# Patient Record
Sex: FEMALE | Race: WHITE | NOT HISPANIC OR LATINO | Employment: FULL TIME | URBAN - METROPOLITAN AREA
[De-identification: names, ages, dates, MRNs, and addresses within clinical notes are randomized per-mention and may not be internally consistent; named-entity substitution may affect disease eponyms.]

---

## 2017-04-14 ENCOUNTER — APPOINTMENT (OUTPATIENT)
Dept: WOMENS IMAGING | Facility: HOSPITAL | Age: 52
End: 2017-04-14

## 2017-04-14 PROCEDURE — 77063 BREAST TOMOSYNTHESIS BI: CPT | Performed by: RADIOLOGY

## 2017-04-14 PROCEDURE — 77067 SCR MAMMO BI INCL CAD: CPT | Performed by: RADIOLOGY

## 2017-04-14 PROCEDURE — G0202 SCR MAMMO BI INCL CAD: HCPCS | Performed by: RADIOLOGY

## 2018-04-16 ENCOUNTER — APPOINTMENT (OUTPATIENT)
Dept: WOMENS IMAGING | Facility: HOSPITAL | Age: 53
End: 2018-04-16

## 2018-04-16 PROCEDURE — 77063 BREAST TOMOSYNTHESIS BI: CPT | Performed by: RADIOLOGY

## 2018-04-16 PROCEDURE — 77067 SCR MAMMO BI INCL CAD: CPT | Performed by: RADIOLOGY

## 2018-05-29 ENCOUNTER — APPOINTMENT (OUTPATIENT)
Dept: WOMENS IMAGING | Facility: HOSPITAL | Age: 53
End: 2018-05-29

## 2018-05-29 PROCEDURE — 76641 ULTRASOUND BREAST COMPLETE: CPT | Performed by: RADIOLOGY

## 2019-04-18 ENCOUNTER — APPOINTMENT (OUTPATIENT)
Dept: WOMENS IMAGING | Facility: HOSPITAL | Age: 54
End: 2019-04-18

## 2019-04-18 PROCEDURE — 77067 SCR MAMMO BI INCL CAD: CPT | Performed by: RADIOLOGY

## 2019-04-18 PROCEDURE — 77063 BREAST TOMOSYNTHESIS BI: CPT | Performed by: RADIOLOGY

## 2020-06-01 ENCOUNTER — APPOINTMENT (OUTPATIENT)
Dept: WOMENS IMAGING | Facility: HOSPITAL | Age: 55
End: 2020-06-01

## 2020-06-01 PROCEDURE — 77067 SCR MAMMO BI INCL CAD: CPT | Performed by: RADIOLOGY

## 2020-06-01 PROCEDURE — 77063 BREAST TOMOSYNTHESIS BI: CPT | Performed by: RADIOLOGY

## 2021-06-03 ENCOUNTER — APPOINTMENT (OUTPATIENT)
Dept: WOMENS IMAGING | Facility: HOSPITAL | Age: 56
End: 2021-06-03

## 2021-06-03 PROCEDURE — 77063 BREAST TOMOSYNTHESIS BI: CPT | Performed by: RADIOLOGY

## 2021-06-03 PROCEDURE — 77067 SCR MAMMO BI INCL CAD: CPT | Performed by: RADIOLOGY

## 2021-07-19 ENCOUNTER — OFFICE VISIT (OUTPATIENT)
Dept: OBSTETRICS AND GYNECOLOGY | Facility: CLINIC | Age: 56
End: 2021-07-19

## 2021-07-19 VITALS
HEIGHT: 65 IN | SYSTOLIC BLOOD PRESSURE: 126 MMHG | DIASTOLIC BLOOD PRESSURE: 74 MMHG | BODY MASS INDEX: 36.32 KG/M2 | WEIGHT: 218 LBS

## 2021-07-19 DIAGNOSIS — Z11.51 ENCOUNTER FOR SCREENING FOR HUMAN PAPILLOMAVIRUS (HPV): ICD-10-CM

## 2021-07-19 DIAGNOSIS — Z01.419 ROUTINE GYNECOLOGICAL EXAMINATION: Primary | ICD-10-CM

## 2021-07-19 DIAGNOSIS — Z01.419 PAP SMEAR, LOW-RISK: ICD-10-CM

## 2021-07-19 LAB
BILIRUB BLD-MCNC: NEGATIVE MG/DL
CLARITY, POC: CLEAR
COLOR UR: YELLOW
GLUCOSE UR STRIP-MCNC: NEGATIVE MG/DL
KETONES UR QL: NEGATIVE
LEUKOCYTE EST, POC: NEGATIVE
NITRITE UR-MCNC: NEGATIVE MG/ML
PH UR: 5 [PH] (ref 5–8)
PROT UR STRIP-MCNC: NEGATIVE MG/DL
RBC # UR STRIP: NEGATIVE /UL
SP GR UR: 1 (ref 1–1.03)
UROBILINOGEN UR QL: NORMAL

## 2021-07-19 PROCEDURE — 81002 URINALYSIS NONAUTO W/O SCOPE: CPT | Performed by: OBSTETRICS & GYNECOLOGY

## 2021-07-19 PROCEDURE — 99386 PREV VISIT NEW AGE 40-64: CPT | Performed by: OBSTETRICS & GYNECOLOGY

## 2021-07-19 RX ORDER — OMEPRAZOLE 20 MG/1
CAPSULE, DELAYED RELEASE ORAL
COMMUNITY
End: 2023-03-13 | Stop reason: SDUPTHER

## 2021-07-19 RX ORDER — CLOTRIMAZOLE AND BETAMETHASONE DIPROPIONATE 10; .64 MG/G; MG/G
CREAM TOPICAL
COMMUNITY

## 2021-07-19 RX ORDER — PREDNISONE 50 MG/1
TABLET ORAL
COMMUNITY
End: 2022-08-15

## 2021-07-19 RX ORDER — LISINOPRIL 5 MG/1
TABLET ORAL
COMMUNITY
Start: 2021-03-15 | End: 2023-03-13 | Stop reason: SDUPTHER

## 2021-07-19 RX ORDER — FEXOFENADINE HCL 180 MG/1
TABLET ORAL
COMMUNITY

## 2021-07-19 RX ORDER — FLUTICASONE PROPIONATE 50 MCG
SPRAY, SUSPENSION (ML) NASAL
COMMUNITY

## 2021-07-19 NOTE — PROGRESS NOTES
GYN Annual Exam     CC- Here for annual exam.     Kenna STERLING is a 56 y.o. female who presents for annual well woman exam. Pt is a new pt to me. LPS was 1 year ago. Pt has not had a menstrual cycle in over one year. Pt reporting vasomotor symptoms. She is not on any HRT. Pt is not sexually active: been 4 years.     OB History    No obstetric history on file.         Current contraception: post menopausal status  History of abnormal Pap smear: no  History of abnormal mammogram: yes - hx of left breast biopsy: benign  Family history of uterine, colon or ovarian cancer: no  Family history of breast cancer: no    Health Maintenance   Topic Date Due   • Annual Gynecologic Pelvic and Breast Exam  Never done   • URINE MICROALBUMIN  Never done   • COLORECTAL CANCER SCREENING  Never done   • ANNUAL PHYSICAL  Never done   • Pneumococcal Vaccine 0-64 (1 of 1 - PPSV23) Never done   • TDAP/TD VACCINES (2 - Tdap) 07/27/2015   • ZOSTER VACCINE (1 of 2) Never done   • HEPATITIS C SCREENING  Never done   • DIABETIC FOOT EXAM  Never done   • DIABETIC EYE EXAM  Never done   • INFLUENZA VACCINE  08/01/2021   • HEMOGLOBIN A1C  11/20/2021   • PAP SMEAR  05/27/2023   • MAMMOGRAM  06/03/2023   • COVID-19 Vaccine  Completed       History reviewed. No pertinent past medical history.    History reviewed. No pertinent surgical history.      Current Outpatient Medications:   •  lisinopril (PRINIVIL,ZESTRIL) 5 MG tablet, TAKE 1 TABLET BY MOUTH EVERY DAY AS DIRECTED, Disp: , Rfl:   •  metFORMIN (GLUCOPHAGE) 500 MG tablet, Take 500 mg by mouth., Disp: , Rfl:   •  vitamin D3 (vitamin d) 125 MCG (5000 UT) capsule capsule, Take 1 capsule by mouth Daily., Disp: , Rfl:   •  clotrimazole-betamethasone (LOTRISONE) 1-0.05 % cream, clotrimazole-betamethasone 1 %-0.05 % topical cream, Disp: , Rfl:   •  cyanocobalamin (VITAMIN B-12) 1000 MCG tablet, Take 1 tablet by mouth Daily., Disp: , Rfl:   •  fexofenadine (Allegra Allergy) 180 MG tablet, Allegra  "Allergy, Disp: , Rfl:   •  fluticasone (FLONASE) 50 MCG/ACT nasal spray, fluticasone propionate 50 mcg/actuation nasal spray,suspension, Disp: , Rfl:   •  Influenza Vac Subunit Quad (FLUCELVAX) 0.5 ML suspension prefilled syringe injection, Flucelvax Quad [id number] (PF) 60 mcg (15 mcg x 4)/0.5 mL IM syringe, Disp: , Rfl:   •  omeprazole (priLOSEC) 20 MG capsule, omeprazole 20 mg capsule,delayed release, Disp: , Rfl:   •  predniSONE (DELTASONE) 50 MG tablet, prednisone 50 mg tablet, Disp: , Rfl:     Allergies   Allergen Reactions   • Sulfa Antibiotics Hives       Social History     Tobacco Use   • Smoking status: Not on file   Substance Use Topics   • Alcohol use: Not on file   • Drug use: Not on file       History reviewed. No pertinent family history.    Review of Systems   Constitutional: Negative for appetite change, chills, fatigue, fever and unexpected weight change.   Gastrointestinal: Negative for abdominal distention, abdominal pain, anal bleeding, blood in stool, constipation, diarrhea, nausea and vomiting.   Genitourinary: Negative for dyspareunia, dysuria, menstrual problem, pelvic pain, vaginal bleeding, vaginal discharge and vaginal pain.       /74   Ht 165.1 cm (65\")   Wt 98.9 kg (218 lb)   LMP  (LMP Unknown) Comment: April 2020   Breastfeeding No   BMI 36.28 kg/m²     Physical Exam  Vitals reviewed.   Constitutional:       General: She is not in acute distress.     Appearance: Normal appearance. She is well-developed. She is not ill-appearing, toxic-appearing or diaphoretic.   HENT:      Mouth/Throat:      Dentition: Normal dentition. No dental caries.   Cardiovascular:      Rate and Rhythm: Normal rate and regular rhythm.      Heart sounds: Normal heart sounds.   Pulmonary:      Effort: Pulmonary effort is normal. No respiratory distress.      Breath sounds: Normal breath sounds. No stridor. No wheezing.   Chest:      Breasts:         Right: No inverted nipple, mass, nipple discharge, skin " change or tenderness.         Left: No inverted nipple, mass, nipple discharge, skin change or tenderness.   Abdominal:      General: There is no distension.      Palpations: Abdomen is soft. There is no mass.      Tenderness: There is no abdominal tenderness.   Genitourinary:     Labia:         Right: No rash, tenderness or lesion.         Left: No rash, tenderness or lesion.       Vagina: No vaginal discharge, tenderness or bleeding.      Cervix: No cervical motion tenderness, discharge or friability.      Uterus: Not deviated, not enlarged, not fixed and not tender.       Adnexa:         Right: No mass, tenderness or fullness.          Left: No mass, tenderness or fullness.     Musculoskeletal:         General: No tenderness. Normal range of motion.   Skin:     General: Skin is warm.      Findings: No erythema or rash.   Neurological:      General: No focal deficit present.      Mental Status: She is alert and oriented to person, place, and time.      Cranial Nerves: No cranial nerve deficit.      Motor: No weakness.      Coordination: Coordination normal.      Gait: Gait normal.   Psychiatric:         Mood and Affect: Mood normal.         Behavior: Behavior normal.         Thought Content: Thought content normal.         Judgment: Judgment normal.            Assessment/Plan    1) GYN HM: Check pap smear.  MMG done 6/2021. SBE demonstrated and encouraged. Colonoscopy 5 years ago- normal.   2) STD screening: declines  3) Bone health - Weight bearing exercise, dietary calcium recommendations and vitamin D reviewed.   4) Diet and Exercise discussed  5) Smoking Status: nonsmoker- quit 11 years ago.   6) Social: Renee Hdz's mother.   7) Follow up prn and 1 year       Diagnoses and all orders for this visit:    Routine gynecological examination  -     POC Urinalysis Dipstick    Pap smear, low-risk  -     IgP, Aptima HPV    Encounter for screening for human papillomavirus (HPV)  -     IgP, Aptima HPV    Other  orders  -     fexofenadine (Allegra Allergy) 180 MG tablet; Allegra Allergy  -     vitamin D3 (vitamin d) 125 MCG (5000 UT) capsule capsule; Take 1 capsule by mouth Daily.  -     clotrimazole-betamethasone (LOTRISONE) 1-0.05 % cream; clotrimazole-betamethasone 1 %-0.05 % topical cream  -     cyanocobalamin (VITAMIN B-12) 1000 MCG tablet; Take 1 tablet by mouth Daily.  -     fluticasone (FLONASE) 50 MCG/ACT nasal spray; fluticasone propionate 50 mcg/actuation nasal spray,suspension  -     Influenza Vac Subunit Quad (FLUCELVAX) 0.5 ML suspension prefilled syringe injection; Flucelvax Quad [id number] (PF) 60 mcg (15 mcg x 4)/0.5 mL IM syringe  -     lisinopril (PRINIVIL,ZESTRIL) 5 MG tablet; TAKE 1 TABLET BY MOUTH EVERY DAY AS DIRECTED  -     metFORMIN (GLUCOPHAGE) 500 MG tablet; Take 500 mg by mouth.  -     omeprazole (priLOSEC) 20 MG capsule; omeprazole 20 mg capsule,delayed release  -     predniSONE (DELTASONE) 50 MG tablet; prednisone 50 mg tablet        Bing Sullivan DO  7/19/2021  12:04 EDT

## 2021-07-21 LAB
CYTOLOGIST CVX/VAG CYTO: NORMAL
CYTOLOGY CVX/VAG DOC CYTO: NORMAL
CYTOLOGY CVX/VAG DOC THIN PREP: NORMAL
DX ICD CODE: NORMAL
HIV 1 & 2 AB SER-IMP: NORMAL
HPV I/H RISK 4 DNA CVX QL PROBE+SIG AMP: NEGATIVE
OTHER STN SPEC: NORMAL
STAT OF ADQ CVX/VAG CYTO-IMP: NORMAL

## 2022-06-06 ENCOUNTER — APPOINTMENT (OUTPATIENT)
Dept: WOMENS IMAGING | Facility: HOSPITAL | Age: 57
End: 2022-06-06

## 2022-06-06 PROCEDURE — 77063 BREAST TOMOSYNTHESIS BI: CPT | Performed by: RADIOLOGY

## 2022-06-06 PROCEDURE — 77067 SCR MAMMO BI INCL CAD: CPT | Performed by: RADIOLOGY

## 2022-08-15 ENCOUNTER — OFFICE VISIT (OUTPATIENT)
Dept: OBSTETRICS AND GYNECOLOGY | Facility: CLINIC | Age: 57
End: 2022-08-15

## 2022-08-15 VITALS
SYSTOLIC BLOOD PRESSURE: 128 MMHG | DIASTOLIC BLOOD PRESSURE: 76 MMHG | BODY MASS INDEX: 37.05 KG/M2 | WEIGHT: 217 LBS | HEIGHT: 64 IN

## 2022-08-15 DIAGNOSIS — Z01.419 PAP SMEAR, LOW-RISK: ICD-10-CM

## 2022-08-15 DIAGNOSIS — Z11.51 ENCOUNTER FOR SCREENING FOR HUMAN PAPILLOMAVIRUS (HPV): ICD-10-CM

## 2022-08-15 DIAGNOSIS — Z01.419 ROUTINE GYNECOLOGICAL EXAMINATION: Primary | ICD-10-CM

## 2022-08-15 PROCEDURE — 99396 PREV VISIT EST AGE 40-64: CPT | Performed by: OBSTETRICS & GYNECOLOGY

## 2022-08-15 PROCEDURE — 81002 URINALYSIS NONAUTO W/O SCOPE: CPT | Performed by: OBSTETRICS & GYNECOLOGY

## 2022-08-15 RX ORDER — BLOOD-GLUCOSE METER
KIT MISCELLANEOUS
COMMUNITY
Start: 2022-06-16 | End: 2023-03-13

## 2022-08-15 RX ORDER — GLIMEPIRIDE 2 MG/1
TABLET ORAL
COMMUNITY
Start: 2022-04-28 | End: 2023-03-13

## 2022-08-15 RX ORDER — GLIMEPIRIDE 2 MG/1
TABLET ORAL
COMMUNITY
Start: 2022-06-06 | End: 2023-03-13

## 2022-08-15 RX ORDER — LISINOPRIL 5 MG/1
1 TABLET ORAL DAILY
COMMUNITY
Start: 2022-03-11

## 2022-08-15 RX ORDER — BLOOD SUGAR DIAGNOSTIC
STRIP MISCELLANEOUS
COMMUNITY
Start: 2022-06-29

## 2022-08-15 RX ORDER — AZITHROMYCIN 250 MG/1
TABLET, FILM COATED ORAL
COMMUNITY
End: 2023-03-13
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
## 2023-02-28 ENCOUNTER — APPOINTMENT (OUTPATIENT)
Dept: CT IMAGING | Facility: HOSPITAL | Age: 58
End: 2023-02-28
Payer: COMMERCIAL

## 2023-02-28 ENCOUNTER — HOSPITAL ENCOUNTER (EMERGENCY)
Facility: HOSPITAL | Age: 58
Discharge: HOME | End: 2023-02-28
Attending: EMERGENCY MEDICINE | Admitting: EMERGENCY MEDICINE
Payer: COMMERCIAL

## 2023-02-28 VITALS
WEIGHT: 198 LBS | OXYGEN SATURATION: 99 % | TEMPERATURE: 97.9 F | RESPIRATION RATE: 16 BRPM | BODY MASS INDEX: 36.44 KG/M2 | HEART RATE: 76 BPM | HEIGHT: 62 IN | SYSTOLIC BLOOD PRESSURE: 160 MMHG | DIASTOLIC BLOOD PRESSURE: 98 MMHG

## 2023-02-28 DIAGNOSIS — S09.90XA INJURY OF HEAD, INITIAL ENCOUNTER: Primary | ICD-10-CM

## 2023-02-28 PROCEDURE — 99283 EMERGENCY DEPT VISIT LOW MDM: CPT

## 2023-02-28 PROCEDURE — 63710000001 ONDANSETRON ODT 4 MG TABLET DISPERSIBLE

## 2023-02-28 PROCEDURE — 70450 CT HEAD/BRAIN W/O DYE: CPT

## 2023-02-28 RX ORDER — ONDANSETRON 4 MG/1
4 TABLET, ORALLY DISINTEGRATING ORAL ONCE
Status: COMPLETED | OUTPATIENT
Start: 2023-02-28 | End: 2023-02-28

## 2023-02-28 RX ADMIN — ONDANSETRON 4 MG: 4 TABLET, ORALLY DISINTEGRATING ORAL at 15:01

## 2023-03-13 ENCOUNTER — OFFICE VISIT (OUTPATIENT)
Dept: GASTROENTEROLOGY | Facility: CLINIC | Age: 58
End: 2023-03-13
Payer: COMMERCIAL

## 2023-03-13 VITALS
BODY MASS INDEX: 37.76 KG/M2 | HEART RATE: 74 BPM | WEIGHT: 205.2 LBS | DIASTOLIC BLOOD PRESSURE: 85 MMHG | OXYGEN SATURATION: 98 % | HEIGHT: 62 IN | TEMPERATURE: 97 F | SYSTOLIC BLOOD PRESSURE: 142 MMHG

## 2023-03-13 DIAGNOSIS — R10.11 RUQ PAIN: Primary | ICD-10-CM

## 2023-03-13 PROCEDURE — 99204 OFFICE O/P NEW MOD 45 MIN: CPT | Performed by: INTERNAL MEDICINE

## 2023-03-13 RX ORDER — OMEPRAZOLE 20 MG/1
20 CAPSULE, DELAYED RELEASE ORAL
Qty: 60 CAPSULE | Refills: 2 | Status: SHIPPED | OUTPATIENT
Start: 2023-03-13

## 2023-03-13 RX ORDER — MULTIVIT WITH MINERALS/LUTEIN
TABLET ORAL DAILY
COMMUNITY

## 2023-03-13 RX ORDER — ALBUTEROL SULFATE 90 UG/1
AEROSOL, METERED RESPIRATORY (INHALATION)
COMMUNITY
Start: 2023-02-21

## 2023-03-13 NOTE — PROGRESS NOTES
Subjective   Chief Complaint   Patient presents with   • Abdominal Pain   • Heartburn       Kenna STERLING is a  57 y.o. female here for abdominal pain and heartburn.  All problems are new to me today.    EGD 2022-LA grade a esophagitis noted, empirically dilated to 18 mm with a balloon.  Gastric biopsies negative for H. pylori.  Esophageal biopsies notable for greater than 20 eosinophils in the proximal and distal esophagus consistent with a EOE    She complains of ruq pain - worse with eating. She has lost 20 lbs with effort.  Worse if she overeats. Nauseated rarely.  She has an fairly urgent BM in the morning - consistency varies.      She takes omeprazole as needed.  She has intermittent reflux at night.  She denies any difficulty swallowing.    Has been on ozempic for about 6 months - pain preceded ozempic.  She reports the diabetes is fairly well controlled and her last A1c was about 6.    U/s -fatty liver, no gallstones or gallbladder abnormality.  HPI  Past Medical History:   Diagnosis Date   • Diabetes mellitus (HCC)    • Diverticulitis of colon     mild   • GERD (gastroesophageal reflux disease)    • Hyperlipidemia    • Hypertension      Past Surgical History:   Procedure Laterality Date   •  SECTION     • COLONOSCOPY  2016    negative per pt   • UPPER GASTROINTESTINAL ENDOSCOPY  2022       Current Outpatient Medications:   •  albuterol sulfate  (90 Base) MCG/ACT inhaler, INHALE 2 PUFFS EVERY 6 HOURS IF NEEDED FOR WHEEZING., Disp: , Rfl:   •  clotrimazole-betamethasone (LOTRISONE) 1-0.05 % cream, clotrimazole-betamethasone 1 %-0.05 % topical cream, Disp: , Rfl:   •  cyanocobalamin (VITAMIN B-12) 1000 MCG tablet, Take 1 tablet by mouth Daily., Disp: , Rfl:   •  fexofenadine (ALLEGRA) 180 MG tablet, Allegra Allergy, Disp: , Rfl:   •  fluticasone (FLONASE) 50 MCG/ACT nasal spray, fluticasone propionate 50 mcg/actuation nasal spray,suspension, Disp: , Rfl:   •  lisinopril  (PRINIVIL,ZESTRIL) 5 MG tablet, Take 1 tablet by mouth Daily., Disp: , Rfl:   •  omeprazole (priLOSEC) 20 MG capsule, Take 1 capsule by mouth 2 (Two) Times a Day Before Meals., Disp: 60 capsule, Rfl: 2  •  OneTouch Verio test strip, USE AS INSTRUCTED TWICE DAILY, Disp: , Rfl:   •  Semaglutide,0.25 or 0.5MG/DOS, (OZEMPIC) 2 MG/1.5ML solution pen-injector, Inject 0.25 mg under the skin into the appropriate area as directed., Disp: , Rfl:   •  vitamin D3 125 MCG (5000 UT) capsule capsule, Take 1 capsule by mouth Daily., Disp: , Rfl:   •  Vitamin E 450 MG (1000 UT) capsule, Take  by mouth Daily., Disp: , Rfl:   PRN Meds:.  Allergies   Allergen Reactions   • Metformin Nausea And Vomiting     GI  GI  GI     • Atorvastatin Myalgia   • Sulfa Antibiotics Hives   • Sulfate Hives     Social History     Socioeconomic History   • Marital status:    Tobacco Use   • Smoking status: Former     Packs/day: 1.00     Types: Cigarettes     Start date: 1982     Quit date: 10/25/2010     Years since quittin.3   • Smokeless tobacco: Never   • Tobacco comments:     quit for 2 yrs both times pregnant   Substance and Sexual Activity   • Alcohol use: Never   • Drug use: Never     History reviewed. No pertinent family history.  Review of Systems   Constitutional: Negative for appetite change and unexpected weight change.   Gastrointestinal: Positive for abdominal pain. Negative for blood in stool, constipation and diarrhea.     Vitals:    23 0859   BP: 142/85   Pulse: 74   Temp: 97 °F (36.1 °C)   SpO2: 98%         23  0859   Weight: 93.1 kg (205 lb 3.2 oz)       Objective   Physical Exam  Constitutional:       Appearance: Normal appearance. She is well-developed.   HENT:      Head: Normocephalic and atraumatic.   Eyes:      General: No scleral icterus.     Conjunctiva/sclera: Conjunctivae normal.   Pulmonary:      Effort: Pulmonary effort is normal.      Breath sounds: Normal breath sounds.   Abdominal:       General: There is no distension.      Palpations: Abdomen is soft.      Tenderness: There is abdominal tenderness. Negative signs include Gonzalez's sign.       Musculoskeletal:      Cervical back: Normal range of motion and neck supple.   Skin:     General: Skin is warm and dry.   Neurological:      Mental Status: She is alert.   Psychiatric:         Mood and Affect: Mood normal.         Behavior: Behavior normal.       No radiology results for the last 7 days    Assessment & Plan   Diagnoses and all orders for this visit:    RUQ pain  -     NM HIDA Scan With Pharmacological Intervention; Future    Other orders  -     albuterol sulfate  (90 Base) MCG/ACT inhaler; INHALE 2 PUFFS EVERY 6 HOURS IF NEEDED FOR WHEEZING.  -     Vitamin E 450 MG (1000 UT) capsule; Take  by mouth Daily.  -     omeprazole (priLOSEC) 20 MG capsule; Take 1 capsule by mouth 2 (Two) Times a Day Before Meals.      Plan:  · Intermittent postprandial right upper quadrant pain.  Recommend HIDA scan for further evaluation.  If this is negative, would proceed with gastric emptying test given history of diabetes and Ozempic use.  · Trial omeprazole twice daily  · Recommend diet and lifestyle modification to reduce acid reflux symptoms such as eating small meals, reducing fat caffeine and alcohol in the diet, avoid eating close to bedtime, eliminate excess weight if applicable.

## 2023-03-30 ENCOUNTER — TELEPHONE (OUTPATIENT)
Dept: GASTROENTEROLOGY | Facility: CLINIC | Age: 58
End: 2023-03-30
Payer: COMMERCIAL

## 2023-03-30 NOTE — TELEPHONE ENCOUNTER
"Caller: Kenna STERLING \"Analisa\"    Relationship to patient: Self    Best call back number: [phone] (Mobile)        Patient is needing: NEREYDA FROM [facility] CALLED ON BEHALF OF THE PT. PT IS SCHED FOR HIDA SCAN AT [facility] ON 4/10/23. PT WANTS THE ORDER SENT TO [facility] THIS IS MORE AFFORDABLE FOR HER OUT OF POCKET. PLEASE CALL PT ASAP.   "

## 2024-02-22 ENCOUNTER — OFFICE VISIT (OUTPATIENT)
Dept: OBSTETRICS AND GYNECOLOGY | Facility: CLINIC | Age: 59
End: 2024-02-22
Payer: COMMERCIAL

## 2024-02-22 VITALS
DIASTOLIC BLOOD PRESSURE: 84 MMHG | BODY MASS INDEX: 37.73 KG/M2 | WEIGHT: 205 LBS | HEIGHT: 62 IN | SYSTOLIC BLOOD PRESSURE: 128 MMHG

## 2024-02-22 DIAGNOSIS — Z01.419 ROUTINE GYNECOLOGICAL EXAMINATION: ICD-10-CM

## 2024-02-22 DIAGNOSIS — Z11.51 SPECIAL SCREENING EXAMINATION FOR HUMAN PAPILLOMAVIRUS (HPV): ICD-10-CM

## 2024-02-22 DIAGNOSIS — Z01.419 PAP SMEAR, AS PART OF ROUTINE GYNECOLOGICAL EXAMINATION: Primary | ICD-10-CM

## 2024-02-22 RX ORDER — LISINOPRIL 10 MG/1
10 TABLET ORAL DAILY
COMMUNITY

## 2024-02-22 RX ORDER — ZINC SULFATE 50(220)MG
CAPSULE ORAL DAILY
COMMUNITY
Start: 2023-09-18 | End: 2024-09-17

## 2024-02-22 RX ORDER — ROSUVASTATIN CALCIUM 5 MG/1
5 TABLET, COATED ORAL DAILY
COMMUNITY
Start: 2024-01-31 | End: 2024-07-29

## 2024-02-22 NOTE — PROGRESS NOTES
GYN Post-menopausal Annual Exam     Chief Complaint   Patient presents with    Gynecologic Exam       Kenna STERLING is a 58 y.o. female who presents for annual well woman exam. She is new to me - yes, but is an established patient of this practice.  Periods absent since x 4 years. She denies vaginal spotting or discharge.  Vasomotor symptoms are improving.    HPI   OB History          3    Para   2    Term   2            AB   1    Living   2         SAB   1    IAB        Ectopic        Molar        Multiple        Live Births   2                Last Mammogram: -neg  Last Dexa: never had one  Last Colonoscopy: 2016  Last Pap: 2022- NIL  HRT: none  History of abnormal Pap smear: yes - bx neg  Family history of uterine, colon or ovarian cancer: no  Family history of breast cancer: no  History of abnormal mammogram: yes - left breast; bx neg        Past Medical History:   Diagnosis Date    Diabetes mellitus     Diverticulitis of colon     mild    GERD (gastroesophageal reflux disease)     Headache, migraine     Hyperlipidemia     Hypertension        Past Surgical History:   Procedure Laterality Date     SECTION  1987     SECTION  12/10/1990    COLONOSCOPY      negative per pt    ENDOMETRIAL ABLATION      UPPER GASTROINTESTINAL ENDOSCOPY  2022    WISDOM TOOTH EXTRACTION  1985         Current Outpatient Medications:     albuterol sulfate  (90 Base) MCG/ACT inhaler, INHALE 2 PUFFS EVERY 6 HOURS IF NEEDED FOR WHEEZING., Disp: , Rfl:     cyanocobalamin (VITAMIN B-12) 1000 MCG tablet, Take 1 tablet by mouth Daily., Disp: , Rfl:     fexofenadine (ALLEGRA) 180 MG tablet, Allegra Allergy, Disp: , Rfl:     fluticasone (FLONASE) 50 MCG/ACT nasal spray, fluticasone propionate 50 mcg/actuation nasal spray,suspension, Disp: , Rfl:     Lactobacillus (PROBIOTIC ACIDOPHILUS PO), Take  by mouth., Disp: , Rfl:     lisinopril (PRINIVIL,ZESTRIL) 10 MG tablet, Take 1 tablet  "by mouth Daily., Disp: , Rfl:     omeprazole (priLOSEC) 20 MG capsule, Take 1 capsule by mouth 2 (Two) Times a Day Before Meals., Disp: 60 capsule, Rfl: 2    rosuvastatin (CRESTOR) 5 MG tablet, Take 1 tablet by mouth Daily., Disp: , Rfl:     vitamin D3 125 MCG (5000 UT) capsule capsule, Take 1 capsule by mouth Daily., Disp: , Rfl:     Vitamin E 450 MG (1000 UT) capsule, Take  by mouth Daily., Disp: , Rfl:     zinc sulfate (ZINCATE) 220 (50 Zn) MG capsule, Take  by mouth Daily., Disp: , Rfl:     clotrimazole-betamethasone (LOTRISONE) 1-0.05 % cream, clotrimazole-betamethasone 1 %-0.05 % topical cream, Disp: , Rfl:     OneTouch Verio test strip, USE AS INSTRUCTED TWICE DAILY, Disp: , Rfl:     Allergies   Allergen Reactions    Metformin Nausea And Vomiting     GI  GI  GI      Atorvastatin Myalgia    Sulfa Antibiotics Hives    Sulfate Hives       Social History     Tobacco Use    Smoking status: Former     Packs/day: 1     Types: Cigarettes     Start date: 1982     Quit date: 10/25/2010     Years since quittin.3    Smokeless tobacco: Never    Tobacco comments:     quit for 2 yrs both times pregnant   Vaping Use    Vaping Use: Never used   Substance Use Topics    Alcohol use: Never    Drug use: Never       Family History   Problem Relation Age of Onset    Stroke Mother     Diabetes Brother     Prostate cancer Maternal Grandfather     Uterine cancer Neg Hx     Breast cancer Neg Hx     Colon cancer Neg Hx     Ovarian cancer Neg Hx        Review of Systems   Endocrine: Positive for heat intolerance.   Genitourinary:  Positive for urinary incontinence (mild; occasional). Negative for breast discharge, breast lump, breast pain and vaginal bleeding.   Psychiatric/Behavioral:  Positive for sleep disturbance.          /84   Ht 157.5 cm (62\")   Wt 93 kg (205 lb)   LMP 2021 Comment: 1 year was last period  BMI 37.49 kg/m²     Physical Exam  Vitals reviewed.   Constitutional:       General: She is awake. " She is not in acute distress.     Appearance: She is not ill-appearing.   HENT:      Head: Normocephalic and atraumatic.   Eyes:      Conjunctiva/sclera: Conjunctivae normal.   Pulmonary:      Effort: Pulmonary effort is normal. No respiratory distress.   Chest:   Breasts:     Right: Normal.      Left: Normal.   Abdominal:      Palpations: Abdomen is soft. There is no mass.      Tenderness: There is no abdominal tenderness.   Genitourinary:     General: Normal vulva.      Exam position: Supine.      Pubic Area: No rash.       Labia:         Right: No rash.         Left: No rash.       Urethra: No urethral lesion.      Vagina: Normal.      Cervix: Normal.      Uterus: Normal.       Adnexa: Right adnexa normal and left adnexa normal.      Comments: Mild vaginal atrophy  Pain with speculum use  Musculoskeletal:      Cervical back: Neck supple. No rigidity.   Lymphadenopathy:      Upper Body:      Right upper body: No axillary adenopathy.      Left upper body: No axillary adenopathy.      Lower Body: No right inguinal adenopathy. No left inguinal adenopathy.   Skin:     General: Skin is warm and dry.      Capillary Refill: Capillary refill takes less than 2 seconds.   Neurological:      Mental Status: She is alert and oriented to person, place, and time.   Psychiatric:         Mood and Affect: Mood and affect normal.         Behavior: Behavior normal.            Assessment     1) GYN annual well woman exam.   2) Postmenopausal      Plan     1) Breast Health - Clinical breast exam & mammogram yearly, Self breast awareness. Schedule screening mammogram.   2) Pap - performed today  3) STD- declined  4) Smoking status-  former  5) Colon health - screening colonoscopy recommended if not up to date  6) Body mass index is 37.49 kg/m². Diet and exercise discussed.  7) Bone health - Weight bearing exercise, dietary calcium recommendations and vitamin D  reviewed.   8) Patient reports she occasional symptoms of urinary  incontinence; not bothersome.        Follow up prn and one year    I spent 20 minutes caring for Kenna on this date of service. This time includes time spent by me in the following activities: preparing for the visit, reviewing tests, obtaining and/or reviewing a separately obtained history, performing a medically appropriate examination and/or evaluation, counseling and educating the patient/family/caregiver, ordering medications, tests, or procedures, and documenting information in the medical record.        Zahra Peña, APRN  2/22/2024  15:48 EST

## 2024-02-26 LAB
CYTOLOGIST CVX/VAG CYTO: NORMAL
CYTOLOGY CVX/VAG DOC CYTO: NORMAL
CYTOLOGY CVX/VAG DOC THIN PREP: NORMAL
DX ICD CODE: NORMAL
HPV I/H RISK 4 DNA CVX QL PROBE+SIG AMP: NEGATIVE
Lab: NORMAL
OTHER STN SPEC: NORMAL
STAT OF ADQ CVX/VAG CYTO-IMP: NORMAL

## 2024-06-10 ENCOUNTER — APPOINTMENT (OUTPATIENT)
Dept: WOMENS IMAGING | Facility: HOSPITAL | Age: 59
End: 2024-06-10
Payer: COMMERCIAL

## 2024-06-10 PROCEDURE — 77063 BREAST TOMOSYNTHESIS BI: CPT | Performed by: RADIOLOGY

## 2024-06-10 PROCEDURE — 77067 SCR MAMMO BI INCL CAD: CPT | Performed by: RADIOLOGY

## 2025-02-27 ENCOUNTER — OFFICE VISIT (OUTPATIENT)
Dept: OBSTETRICS AND GYNECOLOGY | Facility: CLINIC | Age: 60
End: 2025-02-27
Payer: COMMERCIAL

## 2025-02-27 VITALS
HEIGHT: 62 IN | WEIGHT: 217 LBS | BODY MASS INDEX: 39.93 KG/M2 | DIASTOLIC BLOOD PRESSURE: 76 MMHG | SYSTOLIC BLOOD PRESSURE: 128 MMHG

## 2025-02-27 DIAGNOSIS — Z01.419 ROUTINE GYNECOLOGICAL EXAMINATION: Primary | ICD-10-CM

## 2025-02-27 DIAGNOSIS — Z78.0 POSTMENOPAUSAL: ICD-10-CM

## 2025-02-27 DIAGNOSIS — Z01.419 PAP SMEAR, AS PART OF ROUTINE GYNECOLOGICAL EXAMINATION: ICD-10-CM

## 2025-02-27 DIAGNOSIS — N95.2 VAGINAL ATROPHY: ICD-10-CM

## 2025-02-27 DIAGNOSIS — Z12.31 BREAST CANCER SCREENING BY MAMMOGRAM: ICD-10-CM

## 2025-02-27 DIAGNOSIS — Z11.51 SPECIAL SCREENING EXAMINATION FOR HUMAN PAPILLOMAVIRUS (HPV): ICD-10-CM

## 2025-02-27 LAB
BILIRUB BLD-MCNC: NEGATIVE MG/DL
CLARITY, POC: CLEAR
COLOR UR: YELLOW
GLUCOSE UR STRIP-MCNC: NEGATIVE MG/DL
KETONES UR QL: NEGATIVE
LEUKOCYTE EST, POC: NEGATIVE
NITRITE UR-MCNC: NEGATIVE MG/ML
PH UR: 5 [PH] (ref 5–8)
PROT UR STRIP-MCNC: NEGATIVE MG/DL
RBC # UR STRIP: NEGATIVE /UL
SP GR UR: 1.03 (ref 1–1.03)
UROBILINOGEN UR QL: NORMAL

## 2025-02-27 RX ORDER — TIRZEPATIDE 2.5 MG/.5ML
INJECTION, SOLUTION SUBCUTANEOUS
COMMUNITY
Start: 2025-02-21

## 2025-02-27 RX ORDER — GLIMEPIRIDE 2 MG/1
TABLET ORAL
COMMUNITY
Start: 2024-09-13

## 2025-02-27 RX ORDER — PROCHLORPERAZINE 25 MG/1
SUPPOSITORY RECTAL
COMMUNITY
Start: 2025-02-14

## 2025-02-27 RX ORDER — METOPROLOL TARTRATE 25 MG/1
TABLET, FILM COATED ORAL
COMMUNITY
Start: 2024-09-13

## 2025-02-27 RX ORDER — SEMAGLUTIDE 0.68 MG/ML
0.5 INJECTION, SOLUTION SUBCUTANEOUS WEEKLY
COMMUNITY
Start: 2025-01-13

## 2025-02-27 RX ORDER — PROCHLORPERAZINE 25 MG/1
SUPPOSITORY RECTAL SEE ADMIN INSTRUCTIONS
COMMUNITY
Start: 2025-01-16

## 2025-02-27 RX ORDER — ASPIRIN 81 MG
1 TABLET,CHEWABLE ORAL DAILY
COMMUNITY
Start: 2024-12-16

## 2025-02-27 RX ORDER — FUROSEMIDE 40 MG/1
TABLET ORAL
COMMUNITY
Start: 2024-09-13

## 2025-02-27 RX ORDER — TRAZODONE HYDROCHLORIDE 50 MG/1
TABLET ORAL
COMMUNITY
Start: 2024-06-05

## 2025-02-27 RX ORDER — PROCHLORPERAZINE 25 MG/1
SUPPOSITORY RECTAL SEE ADMIN INSTRUCTIONS
COMMUNITY
Start: 2024-11-20

## 2025-02-27 RX ORDER — ROSUVASTATIN CALCIUM 5 MG/1
5 TABLET, COATED ORAL
COMMUNITY

## 2025-02-27 NOTE — PROGRESS NOTES
GYN Post-menopausal Annual Exam     Chief Complaint   Patient presents with    Gynecologic Exam       Kenna STERLING is a 59 y.o. female who presents for annual well woman exam. She is new to me - no.  Periods absent since . She denies vaginal spotting or discharge. She performs SBE monthly- no.     Recent heart surgery in 2024 to correct aortic aneurysm.  Doing well with no concerns.      Additional concerns- none    HPI   OB History          3    Para   2    Term   2            AB   1    Living   2         SAB   1    IAB        Ectopic        Molar        Multiple        Live Births   2                Last Mammogram: in 2024- normal (gets performed at outside facility- unable to see record in chart)  Last Dexa: many years ago  Last Colonoscopy: 2016- due next year  Last Pap: 2024- NIL  HRT: none  History of abnormal Pap smear: yes - bx B9  History of abnormal mammogram: yes - left breast; bx B9  Family history of uterine, colon or ovarian cancer: no  Family history of breast cancer: no        Past Medical History:   Diagnosis Date    Asthma     Diabetes mellitus     Diverticulitis of colon     mild    GERD (gastroesophageal reflux disease)     Gestational diabetes 1986    Headache, migraine     Hyperlipidemia     Hypertension     Spinal headache 12/10/1990    Varicella 1970       Past Surgical History:   Procedure Laterality Date    CARDIAC SURGERY  2024    aortic aneurysm     SECTION  1987     SECTION  12/10/1990    COLONOSCOPY      negative per pt    ENDOMETRIAL ABLATION      UPPER GASTROINTESTINAL ENDOSCOPY  2022    WISDOM TOOTH EXTRACTION           Current Outpatient Medications:     Aspirin Low Dose 81 MG chewable tablet, Chew 1 tablet Daily., Disp: , Rfl:     Continuous Glucose  (Dexcom G6 ) device, See Admin Instructions., Disp: , Rfl:     Continuous Glucose Sensor (Dexcom G6 Sensor), USE ONE SENSOR EVERY 10  DAYS, Disp: , Rfl:     Continuous Glucose Transmitter (Dexcom G6 Transmitter) misc, See Admin Instructions., Disp: , Rfl:     furosemide (LASIX) 40 MG tablet, , Disp: , Rfl:     glimepiride (AMARYL) 2 MG tablet, , Disp: , Rfl:     metoprolol tartrate (LOPRESSOR) 25 MG tablet, , Disp: , Rfl:     Mounjaro 2.5 MG/0.5ML solution auto-injector, INJECT 2.5 MG SUBCUTANEOUSLY ONE TIME PER WEEK, Disp: , Rfl:     Ozempic, 0.25 or 0.5 MG/DOSE, 2 MG/3ML solution pen-injector, Inject 0.5 mg under the skin into the appropriate area as directed 1 (One) Time Per Week., Disp: , Rfl:     traZODone (DESYREL) 50 MG tablet, TAKE 1 TABLET BY MOUTH AT NIGHT IF NEEDED FOR SLEEP, Disp: , Rfl:     albuterol sulfate  (90 Base) MCG/ACT inhaler, INHALE 2 PUFFS EVERY 6 HOURS IF NEEDED FOR WHEEZING., Disp: , Rfl:     clotrimazole-betamethasone (LOTRISONE) 1-0.05 % cream, clotrimazole-betamethasone 1 %-0.05 % topical cream, Disp: , Rfl:     cyanocobalamin (VITAMIN B-12) 1000 MCG tablet, Take 1 tablet by mouth Daily., Disp: , Rfl:     fexofenadine (ALLEGRA) 180 MG tablet, Allegra Allergy, Disp: , Rfl:     fluticasone (FLONASE) 50 MCG/ACT nasal spray, fluticasone propionate 50 mcg/actuation nasal spray,suspension, Disp: , Rfl:     Lactobacillus (PROBIOTIC ACIDOPHILUS PO), Take  by mouth., Disp: , Rfl:     omeprazole (priLOSEC) 20 MG capsule, Take 1 capsule by mouth 2 (Two) Times a Day Before Meals., Disp: 60 capsule, Rfl: 2    OneTouch Verio test strip, USE AS INSTRUCTED TWICE DAILY, Disp: , Rfl:     rosuvastatin (CRESTOR) 5 MG tablet, Take 1 tablet by mouth., Disp: , Rfl:     vitamin D3 125 MCG (5000 UT) capsule capsule, Take 1 capsule by mouth Daily., Disp: , Rfl:     Vitamin E 450 MG (1000 UT) capsule, Take  by mouth Daily., Disp: , Rfl:     Allergies   Allergen Reactions    Metformin Nausea And Vomiting     GI    Atorvastatin Myalgia    Rosuvastatin Myalgia    Sulfa Antibiotics Hives    Sulfate Hives       Social History     Tobacco Use  "   Smoking status: Former     Current packs/day: 0.00     Average packs/day: 1 pack/day for 28.3 years (28.3 ttl pk-yrs)     Types: Cigarettes     Start date: 1982     Quit date: 10/25/2010     Years since quittin.3    Smokeless tobacco: Never    Tobacco comments:     quit for 2 yrs both times pregnant   Vaping Use    Vaping status: Never Used   Substance Use Topics    Alcohol use: Never    Drug use: Never       Family History   Problem Relation Age of Onset    Stroke Mother         2023    Diabetes Mother         type 2    Diabetes Brother     Prostate cancer Maternal Grandfather     Diabetes Brother         type 1    Uterine cancer Neg Hx     Breast cancer Neg Hx     Colon cancer Neg Hx     Ovarian cancer Neg Hx        Review of Systems   Endocrine: Negative for heat intolerance.   Genitourinary:  Positive for urinary incontinence (mild). Negative for breast discharge, breast lump, breast pain, pelvic pain, vaginal bleeding, vaginal discharge and vaginal pain.   Psychiatric/Behavioral:  Negative for sleep disturbance.          /76   Ht 157.5 cm (62\")   Wt 98.4 kg (217 lb)   LMP 2021 Comment: 1 year was last period  BMI 39.69 kg/m²     Physical Exam  Vitals reviewed.   Constitutional:       General: She is awake. She is not in acute distress.     Appearance: She is not ill-appearing.   HENT:      Head: Normocephalic and atraumatic.   Eyes:      Conjunctiva/sclera: Conjunctivae normal.   Pulmonary:      Effort: Pulmonary effort is normal. No respiratory distress.   Chest:   Breasts:     Right: Normal.      Left: Normal.      Comments: Vertical incisional scar to chest wall  Abdominal:      Palpations: Abdomen is soft. There is no mass.      Tenderness: There is no abdominal tenderness.   Genitourinary:     General: Normal vulva.      Exam position: Supine.      Pubic Area: No rash.       Labia:         Right: No rash.         Left: No rash.       Urethra: No urethral lesion.      " Vagina: Normal.      Cervix: Normal.      Uterus: Normal.       Adnexa: Right adnexa normal and left adnexa normal.      Comments: Vaginal atrophy- pain with speculum  Musculoskeletal:      Cervical back: Neck supple. No rigidity.   Lymphadenopathy:      Upper Body:      Right upper body: No axillary adenopathy.      Left upper body: No axillary adenopathy.      Lower Body: No right inguinal adenopathy. No left inguinal adenopathy.   Skin:     General: Skin is warm and dry.      Capillary Refill: Capillary refill takes less than 2 seconds.   Neurological:      Mental Status: She is alert and oriented to person, place, and time.   Psychiatric:         Mood and Affect: Mood and affect normal.         Behavior: Behavior normal.            Assessment     1) GYN annual well woman exam.   2) Postmenopausal        Plan     1) Breast Health - Clinical breast exam & mammogram yearly, Self breast awareness. Schedule screening mammogram (gets them done at an outside facility).   2) Pap - performed today  3) STD- declined  4) Smoking status-  No  5) Colon health - screening colonoscopy due in 2026  6) Body mass index is 39.69 kg/m². Diet and exercise discussed.  7) Bone health - Weight bearing exercise, dietary calcium recommendations and vitamin D  reviewed.   8) Patient reports that she occasionally experiences any symptoms of urinary incontinence but is mild- not bothersome.      Follow up prn and one year      Zahra Peña, APRN  2/27/2025  10:41 EST

## 2025-03-03 LAB
CYTOLOGIST CVX/VAG CYTO: NORMAL
CYTOLOGY CVX/VAG DOC CYTO: NORMAL
CYTOLOGY CVX/VAG DOC THIN PREP: NORMAL
DX ICD CODE: NORMAL
HPV I/H RISK 4 DNA CVX QL PROBE+SIG AMP: NEGATIVE
OTHER STN SPEC: NORMAL
SERVICE CMNT-IMP: NORMAL
STAT OF ADQ CVX/VAG CYTO-IMP: NORMAL

## 2025-06-19 ENCOUNTER — APPOINTMENT (OUTPATIENT)
Dept: WOMENS IMAGING | Facility: HOSPITAL | Age: 60
End: 2025-06-19
Payer: COMMERCIAL

## 2025-06-19 PROCEDURE — 77067 SCR MAMMO BI INCL CAD: CPT | Performed by: RADIOLOGY

## 2025-06-19 PROCEDURE — 77063 BREAST TOMOSYNTHESIS BI: CPT | Performed by: RADIOLOGY
